# Patient Record
Sex: MALE | Race: WHITE | Employment: FULL TIME | URBAN - METROPOLITAN AREA
[De-identification: names, ages, dates, MRNs, and addresses within clinical notes are randomized per-mention and may not be internally consistent; named-entity substitution may affect disease eponyms.]

---

## 2017-06-13 ENCOUNTER — HOSPITAL ENCOUNTER (EMERGENCY)
Age: 32
Discharge: HOME | End: 2017-06-13
Attending: EMERGENCY MEDICINE

## 2017-06-13 VITALS
RESPIRATION RATE: 16 BRPM | WEIGHT: 160 LBS | HEIGHT: 72 IN | TEMPERATURE: 98 F | DIASTOLIC BLOOD PRESSURE: 81 MMHG | OXYGEN SATURATION: 99 % | SYSTOLIC BLOOD PRESSURE: 132 MMHG | BODY MASS INDEX: 21.67 KG/M2 | HEART RATE: 97 BPM

## 2017-06-13 DIAGNOSIS — H10.33 ACUTE CONJUNCTIVITIS OF BOTH EYES, UNSPECIFIED ACUTE CONJUNCTIVITIS TYPE: Primary | ICD-10-CM

## 2017-06-13 PROCEDURE — 99283 EMERGENCY DEPT VISIT LOW MDM: CPT

## 2017-06-13 RX ORDER — POLYMYXIN B SULFATE AND TRIMETHOPRIM 1; 10000 MG/ML; [USP'U]/ML
1 SOLUTION OPHTHALMIC EVERY 4 HOURS
Qty: 10 ML | Refills: 0 | Status: SHIPPED | OUTPATIENT
Start: 2017-06-13 | End: 2017-06-23

## 2017-06-13 ASSESSMENT — ENCOUNTER SYMPTOMS
SHORTNESS OF BREATH: 0
EYE DISCHARGE: 1
EYE REDNESS: 1
COUGH: 0

## 2017-08-28 ENCOUNTER — HOSPITAL ENCOUNTER (EMERGENCY)
Age: 32
Discharge: HOME | End: 2017-08-28
Attending: EMERGENCY MEDICINE

## 2017-08-28 VITALS
OXYGEN SATURATION: 96 % | DIASTOLIC BLOOD PRESSURE: 85 MMHG | TEMPERATURE: 98 F | SYSTOLIC BLOOD PRESSURE: 112 MMHG | BODY MASS INDEX: 21.67 KG/M2 | WEIGHT: 160 LBS | HEIGHT: 72 IN | HEART RATE: 58 BPM | RESPIRATION RATE: 14 BRPM

## 2017-08-28 DIAGNOSIS — M54.50 ACUTE LEFT-SIDED LOW BACK PAIN WITHOUT SCIATICA: Primary | ICD-10-CM

## 2017-08-28 PROCEDURE — 99282 EMERGENCY DEPT VISIT SF MDM: CPT

## 2017-08-28 RX ORDER — CYCLOBENZAPRINE HCL 5 MG
5 TABLET ORAL NIGHTLY PRN
Qty: 5 TABLET | Refills: 0 | Status: SHIPPED | OUTPATIENT
Start: 2017-08-28 | End: 2017-09-02

## 2017-08-28 RX ORDER — IBUPROFEN 800 MG/1
800 TABLET ORAL EVERY 8 HOURS
Qty: 21 TABLET | Refills: 0 | Status: SHIPPED | OUTPATIENT
Start: 2017-08-28 | End: 2018-02-08

## 2017-08-28 ASSESSMENT — PAIN DESCRIPTION - LOCATION: LOCATION: BACK

## 2017-08-28 ASSESSMENT — PAIN DESCRIPTION - DESCRIPTORS: DESCRIPTORS: DULL

## 2017-08-28 ASSESSMENT — PAIN DESCRIPTION - ORIENTATION: ORIENTATION: LOWER

## 2017-08-28 ASSESSMENT — PAIN SCALES - GENERAL: PAINLEVEL_OUTOF10: 5

## 2017-08-28 ASSESSMENT — PAIN DESCRIPTION - FREQUENCY: FREQUENCY: CONTINUOUS

## 2017-10-23 ENCOUNTER — HOSPITAL ENCOUNTER (EMERGENCY)
Age: 32
Discharge: HOME | End: 2017-10-23
Attending: EMERGENCY MEDICINE

## 2017-10-23 ENCOUNTER — APPOINTMENT (OUTPATIENT)
Dept: GENERAL RADIOLOGY | Age: 32
End: 2017-10-23

## 2017-10-23 VITALS
OXYGEN SATURATION: 98 % | TEMPERATURE: 98.2 F | RESPIRATION RATE: 20 BRPM | SYSTOLIC BLOOD PRESSURE: 106 MMHG | WEIGHT: 155 LBS | BODY MASS INDEX: 20.99 KG/M2 | HEIGHT: 72 IN | HEART RATE: 67 BPM | DIASTOLIC BLOOD PRESSURE: 49 MMHG

## 2017-10-23 DIAGNOSIS — R07.9 CHEST PAIN, UNSPECIFIED TYPE: Primary | ICD-10-CM

## 2017-10-23 LAB
EKG ATRIAL RATE: 59 BPM
EKG P AXIS: 65 DEGREES
EKG P-R INTERVAL: 152 MS
EKG Q-T INTERVAL: 488 MS
EKG QRS DURATION: 160 MS
EKG QTC CALCULATION (BAZETT): 483 MS
EKG R AXIS: 94 DEGREES
EKG T AXIS: 69 DEGREES
EKG VENTRICULAR RATE: 59 BPM

## 2017-10-23 PROCEDURE — 99285 EMERGENCY DEPT VISIT HI MDM: CPT

## 2017-10-23 PROCEDURE — 6360000002 HC RX W HCPCS: Performed by: EMERGENCY MEDICINE

## 2017-10-23 PROCEDURE — 93005 ELECTROCARDIOGRAM TRACING: CPT

## 2017-10-23 PROCEDURE — 71020 XR CHEST STANDARD TWO VW: CPT

## 2017-10-23 PROCEDURE — 96374 THER/PROPH/DIAG INJ IV PUSH: CPT

## 2017-10-23 RX ORDER — KETOROLAC TROMETHAMINE 30 MG/ML
30 INJECTION, SOLUTION INTRAMUSCULAR; INTRAVENOUS ONCE
Status: COMPLETED | OUTPATIENT
Start: 2017-10-23 | End: 2017-10-23

## 2017-10-23 RX ORDER — KETOROLAC TROMETHAMINE 30 MG/ML
30 INJECTION, SOLUTION INTRAMUSCULAR; INTRAVENOUS ONCE
Status: DISCONTINUED | OUTPATIENT
Start: 2017-10-23 | End: 2017-10-23

## 2017-10-23 RX ADMIN — KETOROLAC TROMETHAMINE 30 MG: 30 INJECTION, SOLUTION INTRAMUSCULAR at 02:58

## 2017-10-23 ASSESSMENT — PAIN DESCRIPTION - PAIN TYPE: TYPE: ACUTE PAIN

## 2017-10-23 ASSESSMENT — PAIN DESCRIPTION - ORIENTATION: ORIENTATION: LEFT

## 2017-10-23 ASSESSMENT — ENCOUNTER SYMPTOMS
BACK PAIN: 0
RESPIRATORY NEGATIVE: 1
NAUSEA: 0
EYES NEGATIVE: 1
ABDOMINAL PAIN: 0
SHORTNESS OF BREATH: 0
COUGH: 0
GASTROINTESTINAL NEGATIVE: 1

## 2017-10-23 ASSESSMENT — PAIN SCALES - GENERAL
PAINLEVEL_OUTOF10: 8
PAINLEVEL_OUTOF10: 8
PAINLEVEL_OUTOF10: 4

## 2017-10-23 ASSESSMENT — PAIN DESCRIPTION - LOCATION: LOCATION: CHEST

## 2017-10-23 ASSESSMENT — PAIN DESCRIPTION - DESCRIPTORS: DESCRIPTORS: SHARP;RADIATING

## 2017-10-23 NOTE — ED NOTES
Pt given instructions for follow-up and discharge. Pt verbalizes understanding. Pt reports pain is decreased at this time. Pt is A&O x4, PWD, eupneic, and ambulatory with steady, even gait upon discharge.       Cristi Diez RN  10/23/17 8549

## 2017-10-23 NOTE — ED PROVIDER NOTES
instructions at the bedside. CRITICAL CARE:     PROCEDURES:    Procedures    DIAGNOSTIC RESULTS   EKG: All EKG's are interpreted by the Emergency Department Physician who either signs or Co-signs this chart in the absence of a cardiologist.  Ekg, rate of 59, sinus yeimy, rbbb, nonspecific st seg changes, no acute ischemic changes, unchanged compared to prior in 2014. RADIOLOGY:All plain film, CT, MRI, and formal ultrasound images (except ED bedside ultrasound) are read by the radiologist and interpretations are viewed by the emergency physician. XR CHEST STANDARD (2 VW)   Final Result   No acute process. LABS: All lab results were reviewed by myself, and all abnormals are listed below. Labs Reviewed - No data to display  EMERGENCY DEPARTMENT COURSE:     Vitals:    Vitals:    10/23/17 0232 10/23/17 0245 10/23/17 0338   BP: 125/66 (!) 124/54 (!) 106/49   Pulse: 59 74 67   Resp: 28 14 20   Temp: 98.2 °F (36.8 °C)     TempSrc: Oral     SpO2: 98% 100% 98%   Weight: 155 lb (70.3 kg)     Height: 6' (1.829 m)       The patient was given the following medications while in the emergency department:  Orders Placed This Encounter   Medications    DISCONTD: ketorolac (TORADOL) injection 30 mg    ketorolac (TORADOL) injection 30 mg     CONSULTS:  None    FINAL IMPRESSION      1.  Chest pain, unspecified type          DISPOSITION/PLAN   DISPOSITION Decision to Discharge    PATIENT REFERRED TO:  see clinic list    Call in 1 day      DISCHARGE MEDICATIONS:  New Prescriptions    No medications on file     Justin Scruggs MD  Attending Emergency Physician                      Bhumika Lara MD  10/23/17 9775

## 2017-12-12 ENCOUNTER — APPOINTMENT (OUTPATIENT)
Dept: GENERAL RADIOLOGY | Age: 32
End: 2017-12-12
Payer: COMMERCIAL

## 2017-12-12 ENCOUNTER — HOSPITAL ENCOUNTER (EMERGENCY)
Age: 32
Discharge: HOME | End: 2017-12-12
Attending: EMERGENCY MEDICINE
Payer: COMMERCIAL

## 2017-12-12 VITALS
HEIGHT: 71 IN | SYSTOLIC BLOOD PRESSURE: 113 MMHG | HEART RATE: 78 BPM | RESPIRATION RATE: 16 BRPM | OXYGEN SATURATION: 100 % | DIASTOLIC BLOOD PRESSURE: 79 MMHG | BODY MASS INDEX: 22.4 KG/M2 | TEMPERATURE: 98.5 F | WEIGHT: 160 LBS

## 2017-12-12 DIAGNOSIS — R05.9 COUGH: Primary | ICD-10-CM

## 2017-12-12 PROCEDURE — 99283 EMERGENCY DEPT VISIT LOW MDM: CPT

## 2017-12-12 PROCEDURE — 71020 XR CHEST STANDARD TWO VW: CPT

## 2017-12-12 PROCEDURE — 6370000000 HC RX 637 (ALT 250 FOR IP): Performed by: PHYSICIAN ASSISTANT

## 2017-12-12 RX ORDER — PREDNISONE 20 MG/1
40 TABLET ORAL DAILY
Qty: 10 TABLET | Refills: 0 | Status: SHIPPED | OUTPATIENT
Start: 2017-12-12 | End: 2017-12-17

## 2017-12-12 RX ORDER — BENZONATATE 100 MG/1
100 CAPSULE ORAL ONCE
Status: COMPLETED | OUTPATIENT
Start: 2017-12-12 | End: 2017-12-12

## 2017-12-12 RX ORDER — ALBUTEROL SULFATE 90 UG/1
2 AEROSOL, METERED RESPIRATORY (INHALATION) EVERY 6 HOURS PRN
Qty: 1 INHALER | Refills: 0 | Status: SHIPPED | OUTPATIENT
Start: 2017-12-12

## 2017-12-12 RX ORDER — BENZONATATE 100 MG/1
100 CAPSULE ORAL 3 TIMES DAILY PRN
Qty: 15 CAPSULE | Refills: 0 | Status: SHIPPED | OUTPATIENT
Start: 2017-12-12

## 2017-12-12 RX ADMIN — BENZONATATE 100 MG: 100 CAPSULE ORAL at 21:44

## 2017-12-12 ASSESSMENT — ENCOUNTER SYMPTOMS
COUGH: 1
VOMITING: 0
SORE THROAT: 1
WHEEZING: 0
SHORTNESS OF BREATH: 0
ANAL BLEEDING: 0
NAUSEA: 0
RHINORRHEA: 0

## 2017-12-13 NOTE — ED PROVIDER NOTES
[address] ED  eMERGENCY dEPARTMENT eNCOUnter      Pt Name: Hugo Amaya  MRN: [id number]  [place] 1985  Date of evaluation: 12/12/2017  Provider: Viola Harvey PA-C    CHIEF COMPLAINT       Chief Complaint   Patient presents with    Cough           HISTORY OF PRESENT ILLNESS  (Location/Symptom, Timing/Onset, Context/Setting, Quality, Duration, Modifying Factors, Severity.)   Hugo Amaya is a 33 y.o. male who presents to the emergency department with complaints of cough for 3-4 days. Pt states he had been coughing up yellow and green phlegm. States he has associated nasal congestion, scrathcy throat, and mild headache. Denies fever, chills, ear pain, chest pain, sob, nausea, vomiting, abd pain. No history of DVT or PE. No other complaints. Nursing Notes were reviewed. REVIEW OF SYSTEMS    (2-9 systems for level 4, 10 or more for level 5)     Review of Systems   Constitutional: Negative for chills and fever. HENT: Positive for congestion and sore throat. Negative for ear pain and rhinorrhea. Respiratory: Positive for cough. Negative for shortness of breath and wheezing. Cardiovascular: Negative for chest pain. Gastrointestinal: Negative for anal bleeding, nausea and vomiting. Neurological: Positive for headaches. Except as noted above the remainder of the review of systems was reviewed and negative.        PAST MEDICAL HISTORY     Past Medical History:   Diagnosis Date    Mitral valve prolapse      None otherwise stated in nurses notes    SURGICAL HISTORY       Past Surgical History:   Procedure Laterality Date    CARDIAC SURGERY  infant     None otherwise stated in nurses notes    CURRENT MEDICATIONS       Discharge Medication List as of 12/12/2017 10:28 PM      CONTINUE these medications which have NOT CHANGED    Details   ibuprofen (ADVIL;MOTRIN) 800 MG tablet Take 1 tablet by mouth every 8 hours as needed for Pain, Disp-30 tablet, R-0             ALLERGIES Inhale 2 puffs into the lungs every 6 hours as needed for Wheezing, Disp-1 Inhaler, R-0Print      predniSONE (DELTASONE) 20 MG tablet Take 2 tablets by mouth daily for 5 days, Disp-10 tablet, R-0Print      benzonatate (TESSALON PERLES) 100 MG capsule Take 1 capsule by mouth 3 times daily as needed for Cough, Disp-15 capsule, R-0Print               Summation      Patient Course:  Cough for 3-4 days with yellow sputum. Headache, scratchy throat, and nasal congestion. Xray unremarkable. Will discharge home with tessalon perles, prednisone, albuterol. Follow up with PCP. Return if symptoms change or worsen. Pt understands and agrees with plan. ED Medications administered this visit:    Medications   benzonatate (TESSALON) capsule 100 mg (100 mg Oral Given 12/12/17 2235)       New Prescriptions from this visit:    Discharge Medication List as of 12/12/2017 10:28 PM      START taking these medications    Details   albuterol sulfate HFA (PROAIR HFA) 108 (90 Base) MCG/ACT inhaler Inhale 2 puffs into the lungs every 6 hours as needed for Wheezing, Disp-1 Inhaler, R-0Print      predniSONE (DELTASONE) 20 MG tablet Take 2 tablets by mouth daily for 5 days, Disp-10 tablet, R-0Print      benzonatate (TESSALON PERLES) 100 MG capsule Take 1 capsule by mouth 3 times daily as needed for Cough, Disp-15 capsule, R-0Print             Follow-up:  [facility] ED  [address]enny [address] [id number] [phone]    If symptoms worsen    pcp  see clinic list            Final Impression:   1.  Cough               (Please note that portions of this note were completed with a voice recognition program.  Efforts were made to edit the dictations but occasionally words are mis-transcribed.)      (Please note that portions of this note were completed with a voice recognition program.  Efforts were made to edit the dictations but occasionally words are mis-transcribed.)    Maykel Pool 83, ANTOINE  12/13/17 0022

## 2018-01-03 ENCOUNTER — HOSPITAL ENCOUNTER (EMERGENCY)
Age: 33
Discharge: HOME | End: 2018-01-03
Attending: EMERGENCY MEDICINE
Payer: COMMERCIAL

## 2018-01-03 VITALS
RESPIRATION RATE: 18 BRPM | BODY MASS INDEX: 21.67 KG/M2 | HEART RATE: 89 BPM | WEIGHT: 160 LBS | OXYGEN SATURATION: 100 % | DIASTOLIC BLOOD PRESSURE: 89 MMHG | TEMPERATURE: 97 F | SYSTOLIC BLOOD PRESSURE: 130 MMHG | HEIGHT: 72 IN

## 2018-01-03 DIAGNOSIS — K08.89 PAIN, DENTAL: Primary | ICD-10-CM

## 2018-01-03 PROCEDURE — 99282 EMERGENCY DEPT VISIT SF MDM: CPT

## 2018-01-03 PROCEDURE — 6370000000 HC RX 637 (ALT 250 FOR IP): Performed by: STUDENT IN AN ORGANIZED HEALTH CARE EDUCATION/TRAINING PROGRAM

## 2018-01-03 RX ORDER — PENICILLIN V POTASSIUM 500 MG/1
500 TABLET ORAL 4 TIMES DAILY
Qty: 28 TABLET | Refills: 0 | Status: SHIPPED | OUTPATIENT
Start: 2018-01-03 | End: 2018-01-10

## 2018-01-03 RX ORDER — PENICILLIN V POTASSIUM 250 MG/1
500 TABLET ORAL ONCE
Status: COMPLETED | OUTPATIENT
Start: 2018-01-03 | End: 2018-01-03

## 2018-01-03 RX ORDER — IBUPROFEN 800 MG/1
800 TABLET ORAL ONCE
Status: COMPLETED | OUTPATIENT
Start: 2018-01-03 | End: 2018-01-03

## 2018-01-03 RX ADMIN — PENICILLIN V POTASSIUM 500 MG: 250 TABLET ORAL at 23:16

## 2018-01-03 RX ADMIN — IBUPROFEN 800 MG: 800 TABLET, FILM COATED ORAL at 23:15

## 2018-01-03 ASSESSMENT — ENCOUNTER SYMPTOMS
TROUBLE SWALLOWING: 0
VOICE CHANGE: 0
VOMITING: 0
SHORTNESS OF BREATH: 0
SORE THROAT: 0
NAUSEA: 0
ABDOMINAL PAIN: 0

## 2018-01-03 ASSESSMENT — PAIN DESCRIPTION - PAIN TYPE: TYPE: ACUTE PAIN

## 2018-01-03 ASSESSMENT — PAIN DESCRIPTION - ORIENTATION: ORIENTATION: LEFT

## 2018-01-03 ASSESSMENT — PAIN DESCRIPTION - LOCATION: LOCATION: MOUTH

## 2018-01-03 ASSESSMENT — PAIN SCALES - GENERAL
PAINLEVEL_OUTOF10: 10
PAINLEVEL_OUTOF10: 10

## 2018-01-03 ASSESSMENT — PAIN DESCRIPTION - DESCRIPTORS: DESCRIPTORS: THROBBING

## 2018-01-04 NOTE — ED PROVIDER NOTES
[facility]     Emergency Department     Faculty Attestation    I performed a history and physical examination of the patient and discussed management with the resident. I reviewed the residents note and agree with the documented findings and plan of care. Any areas of disagreement are noted on the chart. I was personally present for the key portions of any procedures. I have documented in the chart those procedures where I was not present during the key portions. I have reviewed the emergency nurses triage note. I agree with the chief complaint, past medical history, past surgical history, allergies, medications, social and family history as documented unless otherwise noted below. Documentation of the HPI, Physical Exam and Medical Decision Making performed by medical students or scribes is based on my personal performance of the HPI, PE and MDM. For Physician Assistant/ Nurse Practitioner cases/documentation I have personally evaluated this patient and have completed at least one if not all key elements of the E/M (history, physical exam, and MDM). Additional findings are as noted. Primary Care Physician: No primary care provider on file. History: This is a 33 y.o. male who presents to the Emergency Department with complaint of Dental pain. Is been ongoing for last 7 days. Patient denies any fever, chills or sweats. The patient denies any difficulty swallowing or shortness of breath    Physical:   height is 6' (1.829 m) and weight is 160 lb (72.6 kg). His oral temperature is 97 °F (36.1 °C). His blood pressure is 130/89 and his pulse is 89. His respiration is 18 and oxygen saturation is 100%. The patient has multiple dental caries noted. There is no tongue elevation noted. The patient has no abscess. Airways patent there is no pooling of oral secretions.      Impression: Dental caries    Plan: Antibiotics, analgesia and dentist follow-up    Anne Andre MD  Attending Emergency  Physician       Vero Moody MD  01/03/18 6516

## 2018-01-04 NOTE — ED PROVIDER NOTES
[address]  ED  Emergency Department Encounter  Emergency Medicine Resident     Pt Name: Ángel Huynh  MRN: [id number]  [place] 1985  Date of evaluation: 1/3/18  PCP:  No primary care provider on file. CHIEF COMPLAINT       Chief Complaint   Patient presents with    Dental Pain     pt stated he has lower and upper left dental pain. HISTORY OF PRESENT ILLNESS  (Location/Symptom, Timing/Onset, Context/Setting, Quality, Duration, Modifying Factors, Severity.)      Ángel Huynh is a 33 y.o. male who presents with (Posterior molar pain. Patient noticed that over the last month he's noticed shipping on his lower left molar, and is not complaining of 2 days of dental pain. Patient denies fever, chills, nausea, vomiting. Patient denies difficulty eating or drinking or tolerating secretions. He states that he does have dental follow-up at the end of this month. Patient denies abscess, or discharge from area. Patient does not have any other complaints at this time. Patient has been using Tylenol intermittently for pain control. PAST MEDICAL / SURGICAL / SOCIAL / FAMILY HISTORY      has a past medical history of Mitral valve prolapse.     has a past surgical history that includes Cardiac surgery (infant). Social History     Social History    Marital status: Single     Spouse name: N/A    Number of children: N/A    Years of education: N/A     Occupational History    Not on file. Social History Main Topics    Smoking status: Current Every Day Smoker     Packs/day: 0.50     Years: 10.00     Types: Cigarettes    Smokeless tobacco: Never Used    Alcohol use No    Drug use: Yes     Types: Marijuana    Sexual activity: Yes     Partners: Female     Other Topics Concern    Not on file     Social History Narrative    No narrative on file       History reviewed. No pertinent family history. Allergies:  Review of patient's allergies indicates no known allergies.     Home (VEETID) 500 MG TABLET    Take 1 tablet by mouth 4 times daily for 7 days       Len Bella DO  Emergency Medicine Resident    (Please note that portions of this note were completed with a voice recognition program.  Efforts were made to edit the dictations but occasionally words are mis-transcribed.)        Len Bella DO  01/03/18 5934

## 2018-01-09 ENCOUNTER — HOSPITAL ENCOUNTER (EMERGENCY)
Age: 33
Discharge: HOME | End: 2018-01-09
Attending: EMERGENCY MEDICINE
Payer: COMMERCIAL

## 2018-01-09 VITALS
RESPIRATION RATE: 17 BRPM | BODY MASS INDEX: 21.7 KG/M2 | WEIGHT: 160 LBS | DIASTOLIC BLOOD PRESSURE: 71 MMHG | HEART RATE: 84 BPM | SYSTOLIC BLOOD PRESSURE: 112 MMHG | OXYGEN SATURATION: 99 % | TEMPERATURE: 97.3 F

## 2018-01-09 DIAGNOSIS — R19.7 NAUSEA VOMITING AND DIARRHEA: Primary | ICD-10-CM

## 2018-01-09 DIAGNOSIS — R11.2 NAUSEA VOMITING AND DIARRHEA: Primary | ICD-10-CM

## 2018-01-09 PROCEDURE — 99283 EMERGENCY DEPT VISIT LOW MDM: CPT

## 2018-01-09 PROCEDURE — 6360000002 HC RX W HCPCS: Performed by: EMERGENCY MEDICINE

## 2018-01-09 RX ORDER — ONDANSETRON 4 MG/1
4 TABLET, FILM COATED ORAL EVERY 8 HOURS PRN
Qty: 8 TABLET | Refills: 0 | Status: SHIPPED | OUTPATIENT
Start: 2018-01-09

## 2018-01-09 RX ORDER — ONDANSETRON 4 MG/1
4 TABLET, FILM COATED ORAL ONCE
Status: COMPLETED | OUTPATIENT
Start: 2018-01-09 | End: 2018-01-09

## 2018-01-09 RX ADMIN — ONDANSETRON HYDROCHLORIDE 4 MG: 4 TABLET, FILM COATED ORAL at 23:29

## 2018-01-09 ASSESSMENT — ENCOUNTER SYMPTOMS
DIARRHEA: 1
BLOOD IN STOOL: 0
SHORTNESS OF BREATH: 0
NAUSEA: 1
CONSTIPATION: 0
COUGH: 0
VOMITING: 1
ABDOMINAL PAIN: 0
BACK PAIN: 0

## 2018-01-10 NOTE — ED NOTES
Pt to ed with c/o nausea, vomiting and diarrhea since waking up. Pt denies abdominal discomfort beside nausea. Pt arrived alert and oriented x 4, states watery diarrhea all day as well, able to keep down Gatorade at this point.       Kim Faye RN  01/09/18 6497

## 2018-01-10 NOTE — ED PROVIDER NOTES
[address] [facility]  ED  Emergency Department Encounter  Emergency Medicine Resident     Pt Name: Josephine Espinoza  MRN: [id number]  [place] 1985  Date of evaluation: 1/9/18  PCP:  No primary care provider on file. CHIEF COMPLAINT       Chief Complaint   Patient presents with    Emesis    Diarrhea       HISTORY OF PRESENT ILLNESS  (Location/Symptom, Timing/Onset, Context/Setting, Quality, Duration, Modifying Factors, Severity.)      Josephine Espinoza is a 33 y.o. male who presents with One episode of nonbloody nonbilious emesis and a few episodes of nonbloody diarrhea for the past day. Patient denies any associated abdominal pain. No fever or chills at home. No one else with similar symptoms. He denies eating anything out of the urinary. No camping or traveling. No history of similar symptoms in the past.  The urinary complaints. States he has been drinking Gatorade without any difficulty. He denies any coughing, congestion, runny nose, sore throat, body aches. No chest pain shortness of breath. he is currently on penicillin for dental pain. PAST MEDICAL / SURGICAL / SOCIAL / FAMILY HISTORY      has a past medical history of Mitral valve prolapse.     has a past surgical history that includes Cardiac surgery (infant). Social History     Social History    Marital status: Single     Spouse name: N/A    Number of children: N/A    Years of education: N/A     Occupational History    Not on file. Social History Main Topics    Smoking status: Current Every Day Smoker     Packs/day: 0.50     Years: 10.00     Types: Cigarettes    Smokeless tobacco: Never Used    Alcohol use No    Drug use: Yes     Types: Marijuana    Sexual activity: Yes     Partners: Female     Other Topics Concern    Not on file     Social History Narrative    No narrative on file       History reviewed. No pertinent family history. Allergies:  Review of patient's allergies indicates no known allergies.     Home well-nourished. No distress. HENT:   Head: Normocephalic and atraumatic. Eyes: Pupils are equal, round, and reactive to light. Neck: Normal range of motion. Neck supple. Cardiovascular: Normal rate, regular rhythm, normal heart sounds and intact distal pulses. No murmur heard. Pulmonary/Chest: Effort normal and breath sounds normal. No respiratory distress. He has no wheezes. He has no rales. He exhibits no tenderness. Abdominal: Soft. He exhibits no distension. There is no tenderness. There is no rebound and no guarding. Abdomen soft, nondistended, nontender. Musculoskeletal: Normal range of motion. He exhibits no edema or tenderness. Neurological: He is alert and oriented to person, place, and time. Skin: Skin is warm and dry. No rash noted. He is not diaphoretic. No erythema. Psychiatric: He has a normal mood and affect. His behavior is normal.   Nursing note and vitals reviewed. DIFFERENTIAL  DIAGNOSIS     PLAN (LABS / IMAGING / EKG):  No orders of the defined types were placed in this encounter. MEDICATIONS ORDERED:  Orders Placed This Encounter   Medications    ondansetron (ZOFRAN) tablet 4 mg    ondansetron (ZOFRAN) 4 MG tablet     Sig: Take 1 tablet by mouth every 8 hours as needed for Nausea or Vomiting     Dispense:  8 tablet     Refill:  0       DDX: gastroenteritis    DIAGNOSTIC RESULTS / EMERGENCY DEPARTMENT COURSE / MDM     LABS:  No results found for this visit on 01/09/18. IMPRESSION: 20-year-old male presenting with nausea, vomiting ×1, diarrhea for the past day and a half. He is able to tolerate by mouth intake. No abdominal pain. No fever or chills. No urinary complaints. No URI symptoms. No one else with similar symptoms at home. No recent traveling, camping. He is on penicillin but no other antibiotics recently. Vital signs completely within normal limits. He is well-appearing, nontoxic. His abdomen is soft, nondistended, nontender.  With a benign

## 2018-02-08 ENCOUNTER — HOSPITAL ENCOUNTER (EMERGENCY)
Age: 33
Discharge: HOME | End: 2018-02-08
Attending: EMERGENCY MEDICINE
Payer: COMMERCIAL

## 2018-02-08 ENCOUNTER — APPOINTMENT (OUTPATIENT)
Dept: GENERAL RADIOLOGY | Age: 33
End: 2018-02-08
Payer: COMMERCIAL

## 2018-02-08 VITALS
BODY MASS INDEX: 21.67 KG/M2 | RESPIRATION RATE: 18 BRPM | WEIGHT: 160 LBS | HEIGHT: 72 IN | OXYGEN SATURATION: 98 % | SYSTOLIC BLOOD PRESSURE: 119 MMHG | DIASTOLIC BLOOD PRESSURE: 76 MMHG | HEART RATE: 80 BPM | TEMPERATURE: 97.2 F
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
## 2018-02-08 PROCEDURE — 6370000000 HC RX 637 (ALT 250 FOR IP): Performed by: STUDENT IN AN ORGANIZED HEALTH CARE EDUCATION/TRAINING PROGRAM

## 2018-02-08 PROCEDURE — 99284 EMERGENCY DEPT VISIT MOD MDM: CPT

## 2018-02-08 PROCEDURE — 73080 X-RAY EXAM OF ELBOW: CPT

## 2018-02-08 PROCEDURE — 72100 X-RAY EXAM L-S SPINE 2/3 VWS: CPT

## 2018-02-08 RX ORDER — ACETAMINOPHEN 325 MG/1
325 TABLET ORAL EVERY 6 HOURS PRN
Qty: 90 TABLET | Refills: 0 | Status: SHIPPED | OUTPATIENT
Start: 2018-02-08

## 2018-02-08 RX ORDER — IBUPROFEN 400 MG/1
400 TABLET ORAL EVERY 8 HOURS
Qty: 60 TABLET | Refills: 0 | Status: SHIPPED | OUTPATIENT
Start: 2018-02-08 | End: 2019-07-25

## 2018-02-08 RX ORDER — ACETAMINOPHEN 325 MG/1
650 TABLET ORAL ONCE
Status: COMPLETED | OUTPATIENT
Start: 2018-02-08 | End: 2018-02-08

## 2018-02-08 RX ORDER — CYCLOBENZAPRINE HCL 10 MG
10 TABLET ORAL 2 TIMES DAILY PRN
Qty: 10 TABLET | Refills: 0 | Status: SHIPPED | OUTPATIENT
Start: 2018-02-08 | End: 2018-02-18

## 2018-02-08 RX ADMIN — ACETAMINOPHEN 650 MG: 325 TABLET ORAL at 01:10

## 2018-02-08 ASSESSMENT — PAIN DESCRIPTION - PAIN TYPE: TYPE: ACUTE PAIN

## 2018-02-08 ASSESSMENT — PAIN DESCRIPTION - FREQUENCY: FREQUENCY: CONTINUOUS

## 2018-02-08 ASSESSMENT — ENCOUNTER SYMPTOMS
SHORTNESS OF BREATH: 0
WHEEZING: 0
BACK PAIN: 1
ABDOMINAL PAIN: 0

## 2018-02-08 ASSESSMENT — PAIN DESCRIPTION - ONSET: ONSET: SUDDEN

## 2018-02-08 ASSESSMENT — PAIN DESCRIPTION - DESCRIPTORS: DESCRIPTORS: SHARP

## 2018-02-08 ASSESSMENT — PAIN DESCRIPTION - ORIENTATION: ORIENTATION: LOWER;RIGHT;LEFT;MID

## 2018-02-08 ASSESSMENT — PAIN DESCRIPTION - LOCATION: LOCATION: BACK

## 2018-02-08 ASSESSMENT — PAIN SCALES - GENERAL: PAINLEVEL_OUTOF10: 8

## 2018-02-08 NOTE — ED PROVIDER NOTES
[facility] ED  Emergency Department Encounter  Emergency Medicine Resident     Pt Name: Michelle Luong  MRN: [id number]  [place] 1985  Date of evaluation: 2/8/18  PCP:  No primary care provider on file. CHIEF COMPLAINT       Chief Complaint   Patient presents with    Fall     slipped and fell on ice denies hitting head or LOC, lower back pain and right elbow pain       HISTORY OF PRESENT ILLNESS  (Location/Symptom, Timing/Onset, Context/Setting, Quality, Duration, Modifying Factors, Severity.)      Michelle Luong is a 33 y.o. male who presents with Back pain after slipping on the iceyesterday. Patient denies any numbness denies any loss of consciousness denies any cervical thoracic pain. Patient notes that he took some Motrin prior to arrival 600 mg with minimal improvement in his symptoms. Patient denies any other concerns or issues at this time. PAST MEDICAL / SURGICAL / SOCIAL / FAMILY HISTORY      has a past medical history of Mitral valve prolapse.     has a past surgical history that includes Cardiac surgery (infant). Social History     Social History    Marital status: Single     Spouse name: N/A    Number of children: N/A    Years of education: N/A     Occupational History    Not on file. Social History Main Topics    Smoking status: Current Every Day Smoker     Packs/day: 0.50     Years: 10.00     Types: Cigarettes    Smokeless tobacco: Never Used    Alcohol use No    Drug use: Yes     Types: Marijuana    Sexual activity: Yes     Partners: Female     Other Topics Concern    Not on file     Social History Narrative    No narrative on file       Patient was advised to stop smoking or to avoid tobacco use    History reviewed. No pertinent family history. Allergies:  Review of patient's allergies indicates no known allergies. Home Medications:  Prior to Admission medications    Medication Sig Start Date End Date Taking?  Authorizing Provider   ibuprofen agreed. The patient had no further questions. RADIOLOGY:  Xr Lumbar Spine (2-3 Views)    Result Date: 2/8/2018  Lumbar spine without radiographic evidence of acute osseous abnormality. L5-S1 mild degenerative disc disease. Xr Elbow Right (min 3 Views)    Result Date: 2/8/2018  EXAMINATION: 3 VIEWS OF THE RIGHT ELBOW 2/8/2018 1:13 am COMPARISON: None. HISTORY: ORDERING SYSTEM PROVIDED HISTORY: Fall, distal humerus/olecranon TECHNOLOGIST PROVIDED HISTORY: Reason for exam:->Fall, distal humerus/olecranon FINDINGS: There is no evidence of acute fracture. There is normal alignment. No acute joint abnormality. No focal osseous lesion. No focal soft tissue abnormality. No acute osseous abnormality. EKG  None    All EKG's are interpreted by the Emergency Department Physician who either signs or Co-signs this chart in the absence of a cardiologist.    PROCEDURES:  None    CONSULTS:  None      FINAL IMPRESSION      1. Fall, initial encounter          DISPOSITION / PLAN     DISPOSITION Decision To Discharge    PATIENT REFERRED TO:  No follow-up provider specified.     DISCHARGE MEDICATIONS:  Discharge Medication List as of 2/8/2018  1:50 AM      START taking these medications    Details   acetaminophen (TYLENOL) 325 MG tablet Take 1 tablet by mouth every 6 hours as needed for Pain, Disp-90 tablet, R-0Print      cyclobenzaprine (FLEXERIL) 10 MG tablet Take 1 tablet by mouth 2 times daily as needed for Muscle spasms, Disp-10 tablet, R-0Print             Salvador Mojica DO  Emergency Medicine Resident    (Please note that portions of this note were completed with a voice recognition program.  Efforts were made to edit the dictations but occasionally words are mis-transcribed.)       Salvador Mojica DO  Resident  02/08/18 9851

## 2018-02-08 NOTE — ED PROVIDER NOTES
[facility] ED     Emergency Department     Faculty Attestation        I performed a history and physical examination of the patient and discussed management with the resident. I reviewed the residents note and agree with the documented findings and plan of care. Any areas of disagreement are noted on the chart. I was personally present for the key portions of any procedures. I have documented in the chart those procedures where I was not present during the key portions. I have reviewed the emergency nurses triage note. I agree with the chief complaint, past medical history, past surgical history, allergies, medications, social and family history as documented unless otherwise noted below. For mid-level providers such as nurse practitioners as well as physicians assistants:    I have personally seen and evaluated the patient. I find the patient's history and physical exam are consistent with NP/PA documentation. I agree with the care provided, treatment rendered, disposition, & follow-up plan. Additional findings are as noted. Vital Signs: /76   Pulse 80   Temp 97.2 °F (36.2 °C) (Oral)   Resp 18   Ht 6' (1.829 m)   Wt 160 lb (72.6 kg)   SpO2 98%   BMI 21.70 kg/m²   PCP:  No primary care provider on file. Pertinent Comments:           Critical Care  None         Note, if the patient's blood pressure was elevated, and they have no history of hypertension, they were informed of the following: The patient may have Pre-hypertension/Hypertension: The patient has been informed that they may have pre-hypertension or Hypertension based on a blood pressure reading in the emergency department. I recommend that the patient call the primary care provider listed on their discharge instructions or a physician of their choice this week to arrange follow up for further evaluation of possible pre-hypertension or Hypertension.   (Please note that

## 2019-07-25 ENCOUNTER — HOSPITAL ENCOUNTER (EMERGENCY)
Age: 34
Discharge: HOME | End: 2019-07-25
Attending: EMERGENCY MEDICINE
Payer: COMMERCIAL

## 2019-07-25 ENCOUNTER — APPOINTMENT (OUTPATIENT)
Dept: GENERAL RADIOLOGY | Age: 34
End: 2019-07-25
Payer: COMMERCIAL

## 2019-07-25 VITALS
SYSTOLIC BLOOD PRESSURE: 111 MMHG | HEART RATE: 69 BPM | OXYGEN SATURATION: 100 % | RESPIRATION RATE: 16 BRPM | TEMPERATURE: 97.7 F | WEIGHT: 160 LBS | HEIGHT: 72 IN | BODY MASS INDEX: 21.67 KG/M2 | DIASTOLIC BLOOD PRESSURE: 67 MMHG

## 2019-07-25 DIAGNOSIS — M79.605 LEFT LEG PAIN: Primary | ICD-10-CM

## 2019-07-25 PROCEDURE — 99283 EMERGENCY DEPT VISIT LOW MDM: CPT

## 2019-07-25 PROCEDURE — 73562 X-RAY EXAM OF KNEE 3: CPT

## 2019-07-25 RX ORDER — CYCLOBENZAPRINE HCL 10 MG
10 TABLET ORAL EVERY 8 HOURS PRN
Qty: 20 TABLET | Refills: 0 | Status: SHIPPED | OUTPATIENT
Start: 2019-07-25

## 2019-07-25 RX ORDER — IBUPROFEN 800 MG/1
800 TABLET ORAL EVERY 8 HOURS PRN
Qty: 20 TABLET | Refills: 0 | Status: SHIPPED | OUTPATIENT
Start: 2019-07-25 | End: 2022-09-01

## 2019-07-25 ASSESSMENT — ENCOUNTER SYMPTOMS
FACIAL SWELLING: 0
COLOR CHANGE: 0
ABDOMINAL PAIN: 0
EYE REDNESS: 0
VOMITING: 0
CONSTIPATION: 0
COUGH: 0
SHORTNESS OF BREATH: 0
EYE DISCHARGE: 0
DIARRHEA: 0

## 2019-07-25 ASSESSMENT — PAIN DESCRIPTION - FREQUENCY: FREQUENCY: CONTINUOUS

## 2019-07-25 ASSESSMENT — PAIN DESCRIPTION - DESCRIPTORS: DESCRIPTORS: CONSTANT;DISCOMFORT;THROBBING

## 2019-07-25 ASSESSMENT — PAIN DESCRIPTION - ORIENTATION: ORIENTATION: LEFT

## 2019-07-25 ASSESSMENT — PAIN SCALES - GENERAL: PAINLEVEL_OUTOF10: 10

## 2019-07-25 ASSESSMENT — PAIN DESCRIPTION - PAIN TYPE: TYPE: ACUTE PAIN

## 2019-07-25 ASSESSMENT — PAIN DESCRIPTION - LOCATION: LOCATION: LEG

## 2020-11-16 ENCOUNTER — HOSPITAL ENCOUNTER (EMERGENCY)
Age: 35
Discharge: HOME | End: 2020-11-16
Attending: EMERGENCY MEDICINE

## 2020-11-16 VITALS
HEART RATE: 64 BPM | BODY MASS INDEX: 21.67 KG/M2 | OXYGEN SATURATION: 99 % | RESPIRATION RATE: 18 BRPM | WEIGHT: 160 LBS | HEIGHT: 72 IN | TEMPERATURE: 98.4 F | SYSTOLIC BLOOD PRESSURE: 126 MMHG | DIASTOLIC BLOOD PRESSURE: 77 MMHG

## 2020-11-16 PROCEDURE — 6370000000 HC RX 637 (ALT 250 FOR IP): Performed by: STUDENT IN AN ORGANIZED HEALTH CARE EDUCATION/TRAINING PROGRAM

## 2020-11-16 PROCEDURE — 99284 EMERGENCY DEPT VISIT MOD MDM: CPT

## 2020-11-16 RX ORDER — IBUPROFEN 800 MG/1
800 TABLET ORAL ONCE
Status: COMPLETED | OUTPATIENT
Start: 2020-11-16 | End: 2020-11-16

## 2020-11-16 RX ORDER — IBUPROFEN 800 MG/1
800 TABLET ORAL EVERY 8 HOURS SCHEDULED
Qty: 30 TABLET | Refills: 0 | Status: SHIPPED | OUTPATIENT
Start: 2020-11-16 | End: 2022-09-01

## 2020-11-16 RX ADMIN — IBUPROFEN 800 MG: 800 TABLET ORAL at 02:15

## 2020-11-16 ASSESSMENT — ENCOUNTER SYMPTOMS
SINUS PAIN: 0
NAUSEA: 0
CHEST TIGHTNESS: 0
SHORTNESS OF BREATH: 0
WHEEZING: 0
VOMITING: 0
BACK PAIN: 0
SINUS PRESSURE: 0

## 2020-11-16 ASSESSMENT — PAIN DESCRIPTION - LOCATION: LOCATION: ARM

## 2020-11-16 ASSESSMENT — PAIN SCALES - GENERAL
PAINLEVEL_OUTOF10: 4
PAINLEVEL_OUTOF10: 4

## 2020-11-16 ASSESSMENT — PAIN DESCRIPTION - FREQUENCY: FREQUENCY: CONTINUOUS

## 2020-11-16 ASSESSMENT — PAIN DESCRIPTION - DESCRIPTORS: DESCRIPTORS: TIGHTNESS

## 2020-11-16 ASSESSMENT — PAIN DESCRIPTION - PAIN TYPE: TYPE: ACUTE PAIN

## 2020-11-16 NOTE — ED PROVIDER NOTES
[facility] ED  Emergency Department Encounter  EmergencyMedicine Resident     Pt Name:Wilfredo Baxter  MRN: [id number]  [place] 1985  Date of evaluation: 11/16/20  PCP:  No primary care provider on file. CHIEF COMPLAINT       Chief Complaint   Patient presents with    Arm Pain     intermittent left arm pain and numbness since friday, woke him up from sleep tonight        HISTORY OF PRESENT ILLNESS  (Location/Symptom, Timing/Onset, Context/Setting, Quality, Duration, Modifying Factors, Severity.)      Laura Gannon is a 30 y.o. male who presents with left arm pain which radiates from left elbow down to fingers of left hand. Patient also reports intermittent numbness and tingling of the hand. Patient states he recently started a new job and has frequent twisting and grasping motions at work. States that pain has been worse since starting this new job. Reports intermittent numbness throughout the day which resolved spontaneously. Denies any recent injury. Denies swelling, shortness of breath, chest pain, fever, chills. Patient reports past medical history of mitral valve prolapse and cardiac surgery with prolapse repair as an infant. PAST MEDICAL / SURGICAL / SOCIAL / FAMILY HISTORY      has a past medical history of Mitral valve prolapse.       has a past surgical history that includes Cardiac surgery (infant).       Social History     Socioeconomic History    Marital status: Single     Spouse name: Not on file    Number of children: Not on file    Years of education: Not on file    Highest education level: Not on file   Occupational History    Not on file   Social Needs    Financial resource strain: Not on file    Food insecurity     Worry: Not on file     Inability: Not on file    Transportation needs     Medical: Not on file     Non-medical: Not on file   Tobacco Use    Smoking status: Current Every Day Smoker     Packs/day: 0.50     Years: 10.00     Pack years: 5.00 Types: Cigarettes    Smokeless tobacco: Never Used   Substance and Sexual Activity    Alcohol use: Yes     Comment: social    Drug use: Yes     Types: Marijuana    Sexual activity: Yes     Partners: Female   Lifestyle    Physical activity     Days per week: Not on file     Minutes per session: Not on file    Stress: Not on file   Relationships    Social connections     Talks on phone: Not on file     Gets together: Not on file     Attends Gnosticism service: Not on file     Active member of club or organization: Not on file     Attends meetings of clubs or organizations: Not on file     Relationship status: Not on file    Intimate partner violence     Fear of current or ex partner: Not on file     Emotionally abused: Not on file     Physically abused: Not on file     Forced sexual activity: Not on file   Other Topics Concern    Not on file   Social History Narrative    Not on file       No family history on file. Allergies:  Patient has no known allergies. Home Medications:  Prior to Admission medications    Medication Sig Start Date End Date Taking?  Authorizing Provider   ibuprofen (ADVIL;MOTRIN) 800 MG tablet Take 1 tablet by mouth every 8 hours 11/16/20  Yes Amber Fountain DO   ibuprofen (ADVIL;MOTRIN) 800 MG tablet Take 1 tablet by mouth every 8 hours as needed for Pain 7/25/19   Brandie Han MD   cyclobenzaprine (FLEXERIL) 10 MG tablet Take 1 tablet by mouth every 8 hours as needed for Muscle spasms 7/25/19   Brandie Han MD   acetaminophen (TYLENOL) 325 MG tablet Take 1 tablet by mouth every 6 hours as needed for Pain 2/8/18   Ellie Diallo DO   ondansetron (ZOFRAN) 4 MG tablet Take 1 tablet by mouth every 8 hours as needed for Nausea or Vomiting 1/9/18   Matthew Greene MD   albuterol sulfate HFA (PROAIR HFA) 108 (90 Base) MCG/ACT inhaler Inhale 2 puffs into the lungs every 6 hours as needed for Wheezing 12/12/17   Brendon Grossman PA-C   benzonatate (TESSALON PERLES) 100 MG capsule Take 1 capsule by mouth 3 times daily as needed for Cough 12/12/17   Kole Diaz PA-C       REVIEW OF SYSTEMS    (2-9 systems for level 4, 10 or more for level 5)      Review of Systems   Constitutional: Negative for fatigue and fever. HENT: Negative for postnasal drip, sinus pressure and sinus pain. Respiratory: Negative for chest tightness, shortness of breath and wheezing. Cardiovascular: Negative for chest pain and palpitations. Gastrointestinal: Negative for nausea and vomiting. Endocrine: Negative for polyuria. Genitourinary: Negative for flank pain and frequency. Musculoskeletal: Positive for arthralgias (Left Elbow pain). Negative for back pain, myalgias, neck pain and neck stiffness. Skin: Negative for rash and wound. Neurological: Negative for weakness and numbness. Psychiatric/Behavioral: Negative for confusion. PHYSICAL EXAM   (up to 7 for level 4, 8 or more for level 5)      INITIAL VITALS:   /77   Pulse 64   Temp 98.4 °F (36.9 °C) (Temporal)   Resp 18   Ht 6' (1.829 m)   Wt 160 lb (72.6 kg)   SpO2 99%   BMI 21.70 kg/m²     Physical Exam  Constitutional:       General: He is not in acute distress. Appearance: He is not toxic-appearing. HENT:      Head: Normocephalic and atraumatic. Cardiovascular:      Rate and Rhythm: Normal rate. Pulses: Normal pulses. Comments: Normal Giuseppe's test of left hand. Pulmonary:      Effort: Pulmonary effort is normal.   Musculoskeletal: Normal range of motion. General: Tenderness (Tenderness to palpation of left medial epicondyle of elbow.) present. No swelling or signs of injury. Right lower leg: No edema. Left lower leg: No edema. Comments: Normal active and passive range of motion in all aspects of left elbow. Normal  strength of left hand. No swelling of extremity, no rash, mottling or cyanosis. Capillary refill less than 2 seconds in all fingers of left hand.    Skin: Capillary Refill: Capillary refill takes less than 2 seconds. Findings: No bruising or erythema. Neurological:      Sensory: No sensory deficit. Motor: No weakness. Comments: Normal strength and intact sensation of left upper extremity. Negative Spurling sign         DIFFERENTIAL  DIAGNOSIS     PLAN (LABS / IMAGING / EKG):  No orders of the defined types were placed in this encounter. MEDICATIONS ORDERED:  Orders Placed This Encounter   Medications    ibuprofen (ADVIL;MOTRIN) tablet 800 mg    ibuprofen (ADVIL;MOTRIN) 800 MG tablet     Sig: Take 1 tablet by mouth every 8 hours     Dispense:  30 tablet     Refill:  0       DDX: Medial epicondylitis, cubital tunnel syndrome, arterial insufficiency    DIAGNOSTIC RESULTS / EMERGENCY DEPARTMENT COURSE / MDM   LAB RESULTS:  No results found for this visit on 11/16/20. IMPRESSION: 60-year-old male in no acute distress presenting with left arm pain with associated numbness and tingling of fingers of left hand. Likely medial epicondylitis versus cubital tunnel syndrome. No evidence of trauma. No signs or symptoms of infection. EMERGENCY DEPARTMENT COURSE:  Evaluated patient bedside, vital signs stable, afebrile. Normal Giuseppe's test on exam.  Very refill less than 2 seconds. Patient with intact sensation all fingers of the left hand. Intact range of motion and normal  strength. Patient with mild amount of tenderness over medial epicondyle. Likely medial epicondylitis. Treated with p.o. ibuprofen in emergency department. Given outpatient prescription for ibuprofen. Patient was given strict ED return cautions and follow-up directions. He verbalized understanding of and agreement with discharge plan    PROCEDURES:  None    CONSULTS:  None    CRITICAL CARE:  Please see attending note    FINAL IMPRESSION      1.  Medial epicondylitis of elbow, left          DISPOSITION / PLAN     DISPOSITION Decision To Discharge 11/16/2020 02:44:29 AM      PATIENT REFERRED TO:  MARLYN [facility]  [address] [facility] [address]  [phone]  Schedule an appointment as soon as possible for a visit in 1 week  For re-evaluation is symptoms do not improve      DISCHARGE MEDICATIONS:  Discharge Medication List as of 11/16/2020  2:48 AM      START taking these medications    Details   !! ibuprofen (ADVIL;MOTRIN) 800 MG tablet Take 1 tablet by mouth every 8 hours, Disp-30 tablet,R-0Print       !! - Potential duplicate medications found. Please discuss with provider.           Evelyne Godinez DO  Emergency Medicine Resident    (Please note that portions of thisnote were completed with a voice recognition program.  Efforts were made to edit the dictations but occasionally words are mis-transcribed.)        Evelyne Godinez DO  Resident  11/16/20 5719

## 2020-11-18 NOTE — ED PROVIDER NOTES
[facility] ED  Emergency Department  Faculty Attestation       I performed a history and physical examination of the patient and discussed management with the resident. I reviewed the residents note and agree with the documented findings including all diagnostic interpretations and plan of care. Any areas of disagreement are noted on the chart. I was personally present for the key portions of any procedures. I have documented in the chart those procedures where I was not present during the key portions. I have reviewed the emergency nurses triage note. I agree with the chief complaint, past medical history, past surgical history, allergies, medications, social and family history as documented unless otherwise noted below. Documentation of the HPI, Physical Exam and Medical Decision Making performed by seraibalexis is based on my personal performance of the HPI, PE and MDM. For Physician Assistant/ Nurse Practitioner cases/documentation I have personally evaluated this patient and have completed at least one if not all key elements of the E/M (history, physical exam, and MDM). Additional findings are as noted. Pertinent Comments     Primary Care Physician: No primary care provider on file. ED Triage Vitals   BP Temp Temp Source Pulse Resp SpO2 Height Weight   11/16/20 0209 11/16/20 0158 11/16/20 0158 11/16/20 0209 11/16/20 0209 11/16/20 0209 11/16/20 0209 11/16/20 0209   126/77 98.4 °F (36.9 °C) Temporal 64 18 99 % 6' (1.829 m) 160 lb (72.6 kg)        History/Physical: This is a 30 y.o. male who presents to the Emergency Department with complaint of left arm pain that is intermittent. Radiates from elbow down to the hand. Performs frequent twisting and grasping at his new job. On exam, has some tenderness over the medial epicondyle at the tendon insertion site. No erythema. Worse with pronation and supination of the arm. He has normal  strength.   Normal sensation throughout the arm at this time. Normal cap refill normal pulses. MDM/Plan: No chest pain or shortness of breath. Has just left arm pain and occasional numbness and tingling. Has tenderness over the medial epicondyle consistent with a overuse injury. NSAIDs. Discharge.   Return if concerns arise      CRITICAL CARE: None     Maria Esther Pittman MD  Attending Emergency Physician         Maria Esther Pittman MD  11/18/20 8311

## 2022-09-01 ENCOUNTER — HOSPITAL ENCOUNTER (EMERGENCY)
Age: 37
Discharge: HOME | End: 2022-09-01
Attending: EMERGENCY MEDICINE

## 2022-09-01 VITALS
DIASTOLIC BLOOD PRESSURE: 79 MMHG | SYSTOLIC BLOOD PRESSURE: 114 MMHG | WEIGHT: 160 LBS | BODY MASS INDEX: 21.67 KG/M2 | TEMPERATURE: 98.3 F | RESPIRATION RATE: 16 BRPM | OXYGEN SATURATION: 98 % | HEIGHT: 72 IN | HEART RATE: 62 BPM

## 2022-09-01 DIAGNOSIS — G56.02 CARPAL TUNNEL SYNDROME ON LEFT: Primary | ICD-10-CM

## 2022-09-01 PROCEDURE — 99283 EMERGENCY DEPT VISIT LOW MDM: CPT

## 2022-09-01 RX ORDER — IBUPROFEN 600 MG/1
600 TABLET ORAL EVERY 8 HOURS PRN
Qty: 30 TABLET | Refills: 0 | Status: SHIPPED | OUTPATIENT
Start: 2022-09-01

## 2022-09-01 ASSESSMENT — PAIN SCALES - GENERAL: PAINLEVEL_OUTOF10: 7

## 2022-09-01 NOTE — ED NOTES
Wrist splint applied to LUE per order. Pt shortly after states pain is worse and removed.  MD notified     Landon Helton, MANNIE  09/01/22 3692

## 2022-09-01 NOTE — ED TRIAGE NOTES
Mode of arrival (squad #, walk in, police, etc) : walk in        Chief complaint(s): Left hand/arm numbness        Arrival Note (brief scenario, treatment PTA, etc). : Pt states he has been having left hand/arm numbness for 3 weeks. Pt states the pain starts in his hand and radiates up his arm. Pt denies neck or shoulder pain. C= \"Have you ever felt that you should Cut down on your drinking? \"  No  A= \"Have people Annoyed you by criticizing your drinking? \"  No  G= \"Have you ever felt bad or Guilty about your drinking? \"  No  E= \"Have you ever had a drink as an Eye-opener first thing in the morning to steady your nerves or to help a hangover? \"  No      Deferred []      Reason for deferring: N/A    *If yes to two or more: probable alcohol abuse. *

## 2022-09-01 NOTE — ED PROVIDER NOTES
[address] ED  EMERGENCY DEPARTMENT ENCOUNTER      Pt Name: Juan José Smion  MRN: [id number]  [place] 1985  Date of evaluation: 9/1/22      CHIEF COMPLAINT       Chief Complaint   Patient presents with    Numbness     Left arm         HISTORY OF PRESENT ILLNESS   HPI 40 y.o. male presents with c/o left arm numbness. Symptoms started a few weeks ago. 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 He also notes that he has bad pain in his arm in the morning. Pain goes from his finger tips, up his wrist and into his forearm. He's been taking aspirin with no relief. He has not had other evaluations or treatmens. No neck pain. No rash. REVIEW OF SYSTEMS       Review of Systems   Musculoskeletal:  Negative for arthralgias and neck pain. Skin:  Negative for rash. Neurological:  Positive for numbness. Negative for weakness.      PAST MEDICAL HISTORY     Past Medical History:   Diagnosis Date    Mitral valve prolapse        SURGICAL HISTORY       Past Surgical History:   Procedure Laterality Date    CARDIAC SURGERY  infant       CURRENT MEDICATIONS       Discharge Medication List as of 9/1/2022 12:03 PM        CONTINUE these medications which have NOT CHANGED    Details   cyclobenzaprine (FLEXERIL) 10 MG tablet Take 1 tablet by mouth every 8 hours as needed for Muscle spasms, Disp-20 tablet, R-0Print      acetaminophen (TYLENOL) 325 MG tablet Take 1 tablet by mouth every 6 hours as needed for Pain, Disp-90 tablet, R-0Print      ondansetron (ZOFRAN) 4 MG tablet Take 1 tablet by mouth every 8 hours as needed for Nausea or Vomiting, Disp-8 tablet, R-0Print      albuterol sulfate HFA (PROAIR HFA) 108 (90 Base) MCG/ACT inhaler Inhale 2 puffs into the lungs every 6 hours as needed for Wheezing, Disp-1 Inhaler, R-0Print      benzonatate (TESSALON PERLES) 100 MG capsule Take 1 capsule by mouth 3 times daily as needed for Cough, Disp-15 capsule, R-0Print             ALLERGIES     has No Known Allergies. FAMILY HISTORY     has no family status information on file. SOCIAL HISTORY      reports that he has been smoking cigarettes. He has a 5.00 pack-year smoking history. He has never used smokeless tobacco. He reports current alcohol use. He reports current drug use. Drug: Marijuana Berneta Ran). PHYSICAL EXAM     INITIAL VITALS: /79   Pulse 62   Temp 98.3 °F (36.8 °C) (Oral)   Resp 16   Ht 6' (1.829 m)   Wt 160 lb (72.6 kg)   SpO2 98%   BMI 21.70 kg/m²   Gen: nad  Head: Normocephalic, atraumatic  Eye: Pupils equal round reactive to light, no conjunctivitis  ENT: MMM  Neck: no c-spine ttp  Heart: Regular rate and rhythm no murmurs  Lungs: Clear to auscultation bilaterally, no respiratory distress  Neurologic: Patient is alert and oriented x3, appropriate hand  strenght. Appropriate opposition. fluent speech  MSK: + tinnel sign.  + Phalen sign  Extremities: appropriate capillary refill. Normal radial pulse. No atrophy of the thenar or hypothernar emminence. MEDICAL DECISION MAKING:     MDM    40 y.o. male with signs of CTS. No neck pain to suggest cervical radiculopathy. Discussed symptomatic management. Recommended close follow up with orthopedic specialist if symtpoms do not improve with the treatments we discussed, return to ED if symptoms worsen, and warning precautions provided.      DIAGNOSTIC RESULTS       EMERGENCY DEPARTMENT COURSE:   Vitals:    Vitals:    09/01/22 1149 09/01/22 1150   BP: 114/79    Pulse: 62    Resp:  16   Temp:  98.3 °F (36.8 °C)   TempSrc:  Oral   SpO2: 98%    Weight:  160 lb (72.6 kg)   Height:  6' (1.829 m)       The patient was given the following medications while in the emergency department:  Orders Placed This Encounter   Medications    ibuprofen (ADVIL;MOTRIN) 600 MG tablet     Sig: Take 1 tablet by mouth every 8 hours as needed for Pain     Dispense:  30 tablet     Refill:  0     -------------------------  CRITICAL CARE:   CONSULTS: None  PROCEDURES: Procedures     FINAL IMPRESSION      1.  Carpal tunnel syndrome on left          DISPOSITION/PLAN   DISPOSITION Decision To Discharge 09/01/2022 11:47:36 AM      PATIENT REFERRED TO:  [facility]  [address]  [address] [facility] [id number] [phone]  In 1 week      [address]  [address]  [address] [facility] [address]  [phone]    If symptoms worsen      DISCHARGE MEDICATIONS:  Discharge Medication List as of 9/1/2022 12:03 PM            Palak Potter MD  Attending Emergency Physician                      Palak Potter MD  09/01/22 3267

## 2024-12-06 ENCOUNTER — HOSPITAL ENCOUNTER (EMERGENCY)
Age: 39
Discharge: HOME | End: 2024-12-06
Attending: STUDENT IN AN ORGANIZED HEALTH CARE EDUCATION/TRAINING PROGRAM
Payer: COMMERCIAL

## 2024-12-06 ENCOUNTER — APPOINTMENT (OUTPATIENT)
Dept: GENERAL RADIOLOGY | Age: 39
End: 2024-12-06
Payer: COMMERCIAL

## 2024-12-06 VITALS
DIASTOLIC BLOOD PRESSURE: 84 MMHG | SYSTOLIC BLOOD PRESSURE: 130 MMHG | OXYGEN SATURATION: 100 % | RESPIRATION RATE: 20 BRPM | HEART RATE: 102 BPM | WEIGHT: 160 LBS | TEMPERATURE: 98.7 F | HEIGHT: 72 IN | BODY MASS INDEX: 21.67 KG/M2

## 2024-12-06 DIAGNOSIS — S90.121A CONTUSION OF LESSER TOE OF RIGHT FOOT WITHOUT DAMAGE TO NAIL, INITIAL ENCOUNTER: Primary | ICD-10-CM

## 2024-12-06 PROCEDURE — 73630 X-RAY EXAM OF FOOT: CPT

## 2024-12-06 PROCEDURE — 99283 EMERGENCY DEPT VISIT LOW MDM: CPT

## 2024-12-06 ASSESSMENT — ENCOUNTER SYMPTOMS
CHEST TIGHTNESS: 0
NAUSEA: 0
ABDOMINAL PAIN: 0
VOMITING: 0
DIARRHEA: 0
RHINORRHEA: 0
SHORTNESS OF BREATH: 0
EYE DISCHARGE: 0
SORE THROAT: 0
EYE REDNESS: 0

## 2024-12-06 ASSESSMENT — PAIN SCALES - GENERAL: PAINLEVEL_OUTOF10: 10

## 2024-12-06 ASSESSMENT — PAIN - FUNCTIONAL ASSESSMENT: PAIN_FUNCTIONAL_ASSESSMENT: 0-10

## 2024-12-07 NOTE — ED PROVIDER NOTES
foot without damage to nail, initial encounter          DISPOSITION/PLAN   DISPOSITION Decision To Discharge 12/06/2024 08:40:03 PM   DISPOSITION CONDITION Stable           OUTPATIENT FOLLOW UP THE PATIENT:  Tatiana Sawant, DPM  [address]  Second floor of the [facility] [address]  [phone]    Schedule an appointment as soon as possible for a visit       [facility] Emergency Department  [address] [address]  [phone]    If symptoms worsen      MD Andreina Shepard Jeffrey, MD  12/06/24 2052

## 2025-01-20 ENCOUNTER — HOSPITAL ENCOUNTER (OUTPATIENT)
Dept: PHYSICAL THERAPY | Age: 40
Setting detail: THERAPIES SERIES
Discharge: HOME | End: 2025-01-20
Payer: COMMERCIAL

## 2025-01-20 PROCEDURE — 97110 THERAPEUTIC EXERCISES: CPT

## 2025-01-20 PROCEDURE — 97161 PT EVAL LOW COMPLEX 20 MIN: CPT

## 2025-01-20 NOTE — CONSULTS
[x] [facility]   Outpatient Rehabilitation & Therapy  [address]  P: [phone]   F: [phone]     Physical therapy LE evaluation     Date:  2025  Patient: Wilfredo Ortiz  : 1985  MRN: [id number]  Physician: Ana David APRN - CNP      Insurance: --[place]  , 2-3 x week for 4-6 weeks. 12 visits, valid 1/10 -    Medical Diagnosis: S90.121A (ICD-10-CM) - Contusion of right lesser toe(s) without damage to nail, initial encounter    Rehab Codes: M25.6 , M62.81 , M79.674 , M79.67   Onset Date: 2024 DOI   Next 's appt: TBD      PRECAUTIONS: none    Subjective:   CC: R 5th toe  Patient comes in after work injury. His R 5th toe was smashed from a roller at work. They were able to get the roller off and tried going back to work. Realized he was not able to put weight onto his RLE and went to his supervisor. Did talk to the oncall nurse and was told to follow up at the ER. He went to the ER the next day. Got an xray and found that there was no fracture but very bruised. Reports that his toe was very swollen. Hard to bend the toe as well as toe splaying. Gets intermittent sharp pains. Was on work restriction but then was temporarily taken off in which he reports he was not able to tolerate his normal full work duties d/t the pain. Having issues with squatting and lifting at work with it causing a lot of pain. 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     PMHx: [] Unremarkable [] Diabetes [] HTN  [] Pacemaker   [x] MI/Heart Problems [] Cancer [] Arthritis [] Other:                Past Medical History:   Diagnosis Date    Mitral valve prolapse       PSH:   Past Surgical History:   Procedure Laterality Date    CARDIAC SURGERY  infant        Comorbidities:   [] Obesity [] Dialysis  [] Other:   [] Asthma/COPD [] Dementia [] Other:   [] Stroke [] Sleep apnea

## 2025-01-22 ENCOUNTER — HOSPITAL ENCOUNTER (OUTPATIENT)
Dept: PHYSICAL THERAPY | Age: 40
Setting detail: THERAPIES SERIES
End: 2025-01-22
Payer: COMMERCIAL

## 2025-01-22 ENCOUNTER — HOSPITAL ENCOUNTER (OUTPATIENT)
Dept: PHYSICAL THERAPY | Age: 40
Setting detail: THERAPIES SERIES
Discharge: HOME | End: 2025-01-22
Payer: COMMERCIAL

## 2025-01-22 PROCEDURE — 97110 THERAPEUTIC EXERCISES: CPT

## 2025-01-23 NOTE — FLOWSHEET NOTE
[facility]   Outpatient Rehabilitation & Therapy  [address]  P: [phone]   F: [phone]    Physical Therapy Daily Treatment Note    Date:  2025  Patient Name:  Wilfredo Ortiz    :  1985  MRN: [id number]  Physician: Ana David APRN - CNP                            Insurance: --[place]  , 2-3 x week for 4-6 weeks. 12 visits, valid 1/10 -    Medical Diagnosis: S90.121A (ICD-10-CM) - Contusion of right lesser toe(s) without damage to nail, initial encounter          Rehab Codes: M25.6 , M62.81 , M79.674 , M79.67   Onset Date: 2024 DOI               Next 's appt: TBD  Visit# / total visits:   Cancels/No Shows: 0/0    Subjective:  Patient reports no changes in symptoms since evaluation.  No pain upon arrival due to not having to work today.    Pain:  [] Yes  [x] No Location: Right 4-5th digits/toes   Pain Rating: (0-10 scale) 0/10  Pain altered Tx:  [x] No  [] Yes  Action:  Comments:    Objective:  Modalities:   Precautions:  Exercises:  INTERVENTIONS  Reps/ Time Weight/ Level Completed  Today Comments    PROM Right 4-5 digits  4'    x  pain with flex and ext empty end feel   MODALITIES                                    MANUAL                                    EXERCISES            Kneeling great Toe extension stretch 20\" x3  x    Slant board stretch 3x30\"  x    Towel Scrunches 10 x 3\"   x     Great Toe Extension 10 x   x     Lesser Toe Extension 10 x   x     marble pick ups  1x 14 marbles  x     SL heel raises  10x   x    MOBO board 10x ea   x    Fitter board F/B, Lateral 10x ea dir  x    SLS on foam 20\" x3  x    4-way ankle  10x ea Red x HEP                                                       Specific Instructions for next treatment: review HEP, PROM (self or therapist), marble pick ups, CKC foot / ankle strengthening, kneeling toe extension stretch     Assessment: [x] Progressing toward goals.  Initiated treatment with PROM to 4-5th digit and

## 2025-01-27 ENCOUNTER — APPOINTMENT (OUTPATIENT)
Dept: PHYSICAL THERAPY | Age: 40
End: 2025-01-27
Payer: COMMERCIAL

## 2025-01-29 ENCOUNTER — APPOINTMENT (OUTPATIENT)
Dept: PHYSICAL THERAPY | Age: 40
End: 2025-01-29
Payer: COMMERCIAL

## 2025-01-30 ENCOUNTER — HOSPITAL ENCOUNTER (OUTPATIENT)
Dept: PHYSICAL THERAPY | Age: 40
Setting detail: THERAPIES SERIES
Discharge: HOME | End: 2025-01-30
Payer: COMMERCIAL

## 2025-01-30 NOTE — FLOWSHEET NOTE
[facility]   Outpatient Rehabilitation & Therapy  [address] [facility] [address]  P: [phone]   F: [phone]     Physical Therapy Cancel/No Show note    Date: 2025  Patient: Wilfredo Ortiz  : 1985  MRN: [id number]    Visit Count:   Cancels/No Shows to date:     For today's appointment patient:    [x]  Cancelled    [] Rescheduled appointment    [] No-show     Reason given by patient:    []  Patient ill    []  Conflicting appointment    [] No transportation      [x] Conflict with work    [] No reason given    [] Weather related    [] COVID-19    [] Other:      Comments:        [x] Next appointment was confirmed    Electronically signed by: Danitza Robison PT

## 2025-01-31 ENCOUNTER — HOSPITAL ENCOUNTER (OUTPATIENT)
Dept: PHYSICAL THERAPY | Age: 40
Setting detail: THERAPIES SERIES
Discharge: HOME | End: 2025-01-31
Payer: COMMERCIAL

## 2025-01-31 PROCEDURE — 97110 THERAPEUTIC EXERCISES: CPT

## 2025-01-31 NOTE — FLOWSHEET NOTE
[facility]   Outpatient Rehabilitation & Therapy  [address] [facility] [address]  P: [phone]   F: [phone]    Physical Therapy Daily Treatment Note    Date:  2025  Patient Name:  Wilfredo Ortiz    :  1985  MRN: [id number]  Physician: Ana David APRN - CNP                            Insurance: --[place]  , 2-3 x week for 4-6 weeks. 12 visits, valid 1/10 -    Medical Diagnosis: S90.121A (ICD-10-CM) - Contusion of right lesser toe(s) without damage to nail, initial encounter          Rehab Codes: M25.6 , M62.81 , M79.674 , M79.67   Onset Date: 2024 DOI               Next 's appt: TBD  Visit# / total visits: 3/12  Cancels/No Shows: 0/0    Subjective:  Patient reports that toes felt \"pretty good\" last night at work after a 17 hour shift. Reported that toe is more swollen arriving this afternoon.   Pain:  [] Yes  [x] No Location: Right 4-5th digits/toes   Pain Rating: (0-10 scale) 0/10  Pain altered Tx:  [x] No  [] Yes  Action:  Comments:    Objective:  Modalities:   Precautions:  Exercises:  INTERVENTIONS  Reps/ Time Weight/ Level Completed  Today Comments    PROM Right 4-5 digits  4'    x  pain with flex and ext empty end feel   MODALITIES                                    MANUAL                                    EXERCISES            Kneeling great Toe extension stretch 20\" x3  x    Slant board stretch 3x30\"  x    Towel Scrunches 20 x 3\"   x     Great Toe Extension 10 x   x     Lesser Toe Extension 10 x   x     marble pick ups  1x 14 marbles  x  over and back   SL heel raises  10x 2  x    MOBO board 20x ea peg position  x 1/3, 2/4   Fitter board F/B, Lateral 20x ea dir  x    SLS on foam 20\" x3  x    4-way ankle  10x ea Red  HEP                                                       Specific Instructions for next treatment: review HEP, PROM (self or therapist), marble pick ups, CKC foot / ankle strengthening, kneeling toe extension stretch     Assessment: [x] Progressing

## 2025-02-03 ENCOUNTER — APPOINTMENT (OUTPATIENT)
Dept: PHYSICAL THERAPY | Age: 40
End: 2025-02-03
Payer: COMMERCIAL

## 2025-02-05 ENCOUNTER — APPOINTMENT (OUTPATIENT)
Dept: PHYSICAL THERAPY | Age: 40
End: 2025-02-05
Payer: COMMERCIAL

## 2025-02-06 ENCOUNTER — HOSPITAL ENCOUNTER (OUTPATIENT)
Dept: PHYSICAL THERAPY | Age: 40
Setting detail: THERAPIES SERIES
Discharge: HOME | End: 2025-02-06
Payer: COMMERCIAL

## 2025-02-06 PROCEDURE — 97110 THERAPEUTIC EXERCISES: CPT

## 2025-02-06 PROCEDURE — 97112 NEUROMUSCULAR REEDUCATION: CPT

## 2025-02-06 NOTE — FLOWSHEET NOTE
[facility]   Outpatient Rehabilitation & Therapy  [address] [facility] [address]  P: [phone]   F: [phone]    Physical Therapy Daily Treatment Note    Date:  2025  Patient Name:  Wilfredo Ortiz    :  1985  MRN: [id number]  Physician: Ana David APRN - CNP                            Insurance: --[place]  , 2-3 x week for 4-6 weeks. 12 visits, valid 1/10 -    Medical Diagnosis: S90.121A (ICD-10-CM) - Contusion of right lesser toe(s) without damage to nail, initial encounter          Rehab Codes: M25.6 , M62.81 , M79.674 , M79.67   Onset Date: 2024 DOI               Next 's appt: TBD  Visit# / total visits:   Cancels/No Shows: 1/0    Subjective:  Patient reports still having increased pain in tip of R 5th toe and plantar surface of toe at MP joint.  Pt reports feels like a \"callus\" developing under toe but denies any changes in skin integrity.      Pain:  [] Yes  [x] No Location: Right 4-5th digits/toes   Pain Rating: (0-10 scale) 0/10  Pain altered Tx:  [x] No  [] Yes  Action:  Comments:    Objective:  Modalities:   Precautions:  Exercises:  INTERVENTIONS  Reps/ Time Weight/ Level Completed  Today Comments    PROM Right 4-5 digits  4'    x  pain with flex and ext empty end feel   MODALITIES                                    MANUAL                                    EXERCISES            Kneeling great Toe extension stretch 20\" x3  x    Slant board stretch 3x30\"  x    Towel Scrunches 20 x 3\"   x     Great Toe Extension 15 x   x     Lesser Toe Extension 15 x   x     marble pick ups  1x 14 marbles  x  over and back   SL heel raises  10x 2  x    MOBO board 20x ea peg position  x 1/3, 2/4 Shoe off   Fitter board F/B, Lateral 20x ea dir  x    SLS on foam 20\" x3  x Shoe off   4-way ankle  10x ea Red  HEP   Lunges F 10x  x                                                 Specific Instructions for next treatment: review HEP, PROM (self or therapist), marble pick ups, [facility] foot

## 2025-02-07 ENCOUNTER — HOSPITAL ENCOUNTER (OUTPATIENT)
Dept: PHYSICAL THERAPY | Age: 40
Setting detail: THERAPIES SERIES
Discharge: HOME | End: 2025-02-07
Payer: COMMERCIAL

## 2025-02-07 PROCEDURE — 97110 THERAPEUTIC EXERCISES: CPT

## 2025-02-07 PROCEDURE — 97112 NEUROMUSCULAR REEDUCATION: CPT

## 2025-02-07 NOTE — FLOWSHEET NOTE
[facility]   Outpatient Rehabilitation & Therapy  [address] [facility] [address]  P: [phone]   F: [phone]    Physical Therapy Daily Treatment Note    Date:  2025  Patient Name:  Wilfredo Ortiz    :  1985  MRN: [id number]  Physician: Ana David APRN - CNP                            Insurance: --[place]  , 2-3 x week for 4-6 weeks. 12 visits, valid 1/10 -    Medical Diagnosis: S90.121A (ICD-10-CM) - Contusion of right lesser toe(s) without damage to nail, initial encounter          Rehab Codes: M25.6 , M62.81 , M79.674 , M79.67   Onset Date: 2024 DOI               Next 's appt: TBD  Visit# / total visits:   Cancels/No Shows: 1/0    Subjective:  Patient comes in reporting a little more tenderness but reported that he did a lot more with his shoes not on during last therapy session.    Pain:  [] Yes  [x] No Location: Right 4-5th digits/toes   Pain Rating: (0-10 scale) 4/10  Pain altered Tx:  [x] No  [] Yes  Action:  Comments:    Objective:  Modalities:   Precautions:  Exercises:  INTERVENTIONS  Reps/ Time Weight/ Level Completed  Today Comments    PROM Right 4-5 digits  4'      pain with flex and ext empty end feel   MODALITIES                                    MANUAL                                    EXERCISES            Kneeling great Toe extension stretch 20\" x3      Towel Scrunches 20 x 3\"        Great Toe Extension 15 x   x     Lesser Toe Extension 15 x   x     marble pick ups  1x 14 marbles  x  over and back   4-way ankle 10x eac Red   HEP                 Standing:       SL heel raises  10x 2  x    Heel Raises with Toe Elevation 10 x  x Added 2/7   MOBO board 20x ea peg position  x 1/3, 2/4 Shoe off   Fitter board F/B, Lateral 20x ea dir  x    SLS on foam 20\" x3  x Shoe off   Lunges F RIO 10x  x Progressed to RIO 2/7   Slant Board Stretch 3x30\"                                            Specific Instructions for next treatment: review HEP, PROM (self or

## 2025-02-10 ENCOUNTER — APPOINTMENT (OUTPATIENT)
Dept: PHYSICAL THERAPY | Age: 40
End: 2025-02-10
Payer: COMMERCIAL

## 2025-02-12 ENCOUNTER — APPOINTMENT (OUTPATIENT)
Dept: PHYSICAL THERAPY | Age: 40
End: 2025-02-12
Payer: COMMERCIAL

## 2025-02-13 ENCOUNTER — HOSPITAL ENCOUNTER (OUTPATIENT)
Dept: PHYSICAL THERAPY | Age: 40
Setting detail: THERAPIES SERIES
Discharge: HOME | End: 2025-02-13
Payer: COMMERCIAL

## 2025-02-13 PROCEDURE — 97110 THERAPEUTIC EXERCISES: CPT

## 2025-02-14 ENCOUNTER — HOSPITAL ENCOUNTER (OUTPATIENT)
Dept: PHYSICAL THERAPY | Age: 40
Setting detail: THERAPIES SERIES
Discharge: HOME | End: 2025-02-14
Payer: COMMERCIAL

## 2025-02-14 PROCEDURE — 97110 THERAPEUTIC EXERCISES: CPT

## 2025-02-14 NOTE — PROGRESS NOTES
[facility]   Outpatient Rehabilitation & Therapy  [address]  P: [phone]   F: [phone]    Physical Therapy Daily Treatment Note / Progress Note    Date:  2025  Patient Name:  Wilfredo Ortiz    :  1985  MRN: [id number]  Physician: Ana Dvaid APRN - CNP                            Insurance: --[place]  , 2-3 x week for 4-6 weeks. 12 visits, valid 1/10 -    Medical Diagnosis: S90.121A (ICD-10-CM) - Contusion of right lesser toe(s) without damage to nail, initial encounter          Rehab Codes: M25.6 , M62.81 , M79.674 , M79.67   Onset Date: 2024 DOI               Next 's appt: TBD  Visit# / total visits:   Cancels/No Shows: 1/0    Subjective:   Pt comes in reporting that his pain is not too bad coming in. Reports that he feels like he can walk on his foot now more so than when he first started physical therapy. Feels like the pain has been decreasing, just the tip of his toe where he still gets the sharp pain. Still noticing difficulty with toe scrunch with some pain when he does succeed with it. Certain movements overall seems to irritate it. Pain at worst over the past few weeks has been 4-5/10 overall.     Pain:  [x] Yes  [] No Location: Right 4-5th digits/toes   Pain Rating: (0-10 scale) 4-5/10 at rest  Pain altered Tx:  [x] No  [] Yes  Action:  Comments:    Objective:     Range of Motion  Left  Range of Motion  Right   Proximal Phalange ABD 30 AROM 30 AROM  pain   Proximal Phalange Flexion  60 AROM 15 AROM  pain   Able to complete 25 reps of heel raises with noting mild to moderate pain throughout, increase toe extension noted throughout as well at the RLE    Modalities:   Precautions:  Exercises:  INTERVENTIONS  Reps/ Time Weight/ Level Completed  Today Comments    PROM Right 4-5 digits  4'        MODALITIES                                    MANUAL                                    EXERCISES            Kneeling great Toe extension stretch

## 2025-02-17 ENCOUNTER — APPOINTMENT (OUTPATIENT)
Dept: PHYSICAL THERAPY | Age: 40
End: 2025-02-17
Payer: COMMERCIAL

## 2025-02-19 ENCOUNTER — APPOINTMENT (OUTPATIENT)
Dept: PHYSICAL THERAPY | Age: 40
End: 2025-02-19
Payer: COMMERCIAL

## 2025-02-21 ENCOUNTER — HOSPITAL ENCOUNTER (OUTPATIENT)
Dept: PHYSICAL THERAPY | Age: 40
Setting detail: THERAPIES SERIES
Discharge: HOME | End: 2025-02-21
Payer: COMMERCIAL

## 2025-02-21 PROCEDURE — 97110 THERAPEUTIC EXERCISES: CPT

## 2025-02-21 NOTE — FLOWSHEET NOTE
[facility]   Outpatient Rehabilitation & Therapy  [address] [facility]  P: [phone]   F: [phone]    Physical Therapy Daily Treatment Note     Date:  2025  Patient Name:  Wilfredo Ortiz    :  1985  MRN: [id number]  Physician: Ana David APRN - CNP                            Insurance: --[place]  , 2-3 x week for 4-6 weeks. 12 visits, valid 1/10 -    Medical Diagnosis: S90.121A (ICD-10-CM) - Contusion of right lesser toe(s) without damage to nail, initial encounter          Rehab Codes: M25.6 , M62.81 , M79.674 , M79.67   Onset Date: 2024 DOI               Next 's appt: TBD  Visit# / total visits:   Cancels/No Shows: 1/0    Subjective:   Pt reports 3/10 pain at beginning of session. PT reports being compliant with HEP. Feels like the pain has been decreasing, just the tip of his toe where he still gets the sharp pain.     Pain:  [x] Yes  [] No Location: Right 4-5th digits/toes   Pain Rating: (0-10 scale) 3/10 at rest  Pain altered Tx:  [x] No  [] Yes  Action:  Comments:        Objective:  Taken at PN    Range of Motion  Left  Range of Motion  Right   Proximal Phalange ABD 30 AROM 30 AROM  pain   Proximal Phalange Flexion  60 AROM 15 AROM  pain   Able to complete 25 reps of heel raises with noting mild to moderate pain throughout, increase toe extension noted throughout as well at the RLE    Modalities:   Precautions:  Exercises:  INTERVENTIONS  Reps/ Time Weight/ Level Completed  Today Comments    PROM Right 4-5 digits  4'        MODALITIES                                    MANUAL                                    EXERCISES            Kneeling great Toe extension stretch 20\" x3      Towel Scrunches 20 x 3\"   x     Great Toe Extension 15 x   x     Lesser Toe Extension 15 x   x     marble pick ups  1x 14 marbles    over and back   4-way ankle 15x eac Red   HEP                 Standing:       SL heel raises  10x 2 7.5# KB x Added weight 2/15   Heel

## 2025-02-24 ENCOUNTER — APPOINTMENT (OUTPATIENT)
Dept: PHYSICAL THERAPY | Age: 40
End: 2025-02-24
Payer: COMMERCIAL

## 2025-02-26 ENCOUNTER — APPOINTMENT (OUTPATIENT)
Dept: PHYSICAL THERAPY | Age: 40
End: 2025-02-26
Payer: COMMERCIAL

## 2025-02-28 ENCOUNTER — APPOINTMENT (OUTPATIENT)
Dept: PHYSICAL THERAPY | Age: 40
End: 2025-02-28
Payer: COMMERCIAL

## 2025-04-15 ENCOUNTER — HOSPITAL ENCOUNTER (EMERGENCY)
Age: 40
Discharge: HOME | End: 2025-04-15
Attending: EMERGENCY MEDICINE
Payer: COMMERCIAL

## 2025-04-15 VITALS
RESPIRATION RATE: 16 BRPM | HEART RATE: 82 BPM | HEIGHT: 72 IN | TEMPERATURE: 97.9 F | OXYGEN SATURATION: 99 % | DIASTOLIC BLOOD PRESSURE: 86 MMHG | BODY MASS INDEX: 21.67 KG/M2 | SYSTOLIC BLOOD PRESSURE: 123 MMHG | WEIGHT: 160 LBS

## 2025-04-15 DIAGNOSIS — J02.0 STREPTOCOCCAL SORE THROAT: ICD-10-CM

## 2025-04-15 DIAGNOSIS — J02.9 SORE THROAT: ICD-10-CM

## 2025-04-15 DIAGNOSIS — S70.12XA CONTUSION OF LEFT THIGH, INITIAL ENCOUNTER: ICD-10-CM

## 2025-04-15 DIAGNOSIS — S70.11XA CONTUSION OF RIGHT THIGH, INITIAL ENCOUNTER: Primary | ICD-10-CM

## 2025-04-15 LAB
SPECIMEN SOURCE: ABNORMAL
STREP A, MOLECULAR: POSITIVE

## 2025-04-15 PROCEDURE — 6370000000 HC RX 637 (ALT 250 FOR IP): Performed by: PHYSICIAN ASSISTANT

## 2025-04-15 PROCEDURE — 87651 STREP A DNA AMP PROBE: CPT

## 2025-04-15 PROCEDURE — 99283 EMERGENCY DEPT VISIT LOW MDM: CPT

## 2025-04-15 RX ORDER — AMOXICILLIN 250 MG/1
500 CAPSULE ORAL ONCE
Status: COMPLETED | OUTPATIENT
Start: 2025-04-15 | End: 2025-04-15

## 2025-04-15 RX ORDER — AMOXICILLIN 500 MG/1
500 CAPSULE ORAL 2 TIMES DAILY
Qty: 20 CAPSULE | Refills: 0 | Status: SHIPPED | OUTPATIENT
Start: 2025-04-15 | End: 2025-04-25

## 2025-04-15 RX ADMIN — AMOXICILLIN 500 MG: 250 CAPSULE ORAL at 19:07

## 2025-04-15 ASSESSMENT — PAIN - FUNCTIONAL ASSESSMENT: PAIN_FUNCTIONAL_ASSESSMENT: 0-10

## 2025-04-15 ASSESSMENT — PAIN SCALES - GENERAL: PAINLEVEL_OUTOF10: 8

## 2025-04-15 ASSESSMENT — PAIN DESCRIPTION - LOCATION: LOCATION: LEG;THROAT

## 2025-04-15 ASSESSMENT — PAIN DESCRIPTION - ORIENTATION: ORIENTATION: RIGHT

## 2025-04-15 NOTE — DISCHARGE INSTRUCTIONS
Ice   Tylenol or motrin for pain  Try to see if there is another position that doesn't involve pressing thigh region into hard fixed object repeatedly.

## 2025-04-15 NOTE — ED TRIAGE NOTES
Mode of arrival (squad #, walk in, police, etc) : CAR        Chief complaint(s): LEFT LEG INJURY AND SORE THROAT        Arrival Note (brief scenario, treatment PTA, etc).: states he has large bruises to left leg, states injured at job,noticed 3 days ago. sore throat x 3 days.         C= \"Have you ever felt that you should Cut down on your drinking?\"  No  A= \"Have people Annoyed you by criticizing your drinking?\"  No  G= \"Have you ever felt bad or Guilty about your drinking?\"  No  E= \"Have you ever had a drink as an Eye-opener first thing in the morning to steady your nerves or to help a hangover?\"  No      Deferred []      Reason for deferring: N/A    *If yes to two or more: probable alcohol abuse.*

## 2025-04-15 NOTE — ED PROVIDER NOTES
[facility] EMERGENCY DEPARTMENT  eMERGENCY dEPARTMENT eNCOUnter      Pt Name: Wilfredo Ortiz  MRN: [id number]  Birthdate 1985  Date of evaluation: 4/15/25      CHIEF COMPLAINT       Chief Complaint   Patient presents with    Leg Injury    Pharyngitis         HISTORY OF PRESENT ILLNESS    Wilfredo Ortiz is a 40 y.o. male who presents complaining of leg pain.   The history is provided by the patient.   Leg Injury  Location:  Leg  Time since incident:  2 days  Lower extremity injury: bilateral leg pain, encinas at work bumping into his legs.    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        Bruises to bilateral legs   All other systems reviewed and are negative.      PAST MEDICAL HISTORY     Past Medical History:   Diagnosis Date    Mitral valve prolapse        SURGICAL HISTORY       Past Surgical History:   Procedure Laterality Date    CARDIAC SURGERY  infant       CURRENT MEDICATIONS       Previous Medications    ACETAMINOPHEN (TYLENOL) 325 MG TABLET    Take 1 tablet by mouth every 6 hours as needed for Pain    ALBUTEROL SULFATE HFA (PROAIR HFA) 108 (90 BASE) MCG/ACT INHALER    Inhale 2 puffs into the lungs every 6 hours as needed for Wheezing    BENZONATATE (TESSALON PERLES) 100 MG CAPSULE    Take 1 capsule by mouth 3 times daily as needed for

## 2025-04-16 NOTE — ED PROVIDER NOTES
[facility] EMERGENCY DEPARTMENT  eMERGENCY dEPARTMENT eNCOUnter   Independent Attestation     Pt Name: Wilfredo Ortiz  MRN: [id number]  Birthdate 1985  Date of evaluation: 4/15/25   Wilfredo Ortiz is a 40 y.o. male who presents with Leg Injury and Pharyngitis    Vitals:   Vitals:    04/15/25 1716   BP: 123/86   Pulse: 82   Resp: 16   Temp: 97.9 °F (36.6 °C)   TempSrc: Oral   SpO2: 99%   Weight: 72.6 kg (160 lb)   Height: 1.829 m (6')     Impression:   1. Contusion of right thigh, initial encounter    2. Contusion of left thigh, initial encounter    3. Sore throat    4. Streptococcal sore throat      I was personally available for consultation in the Emergency Department. I have reviewed the chart and agree with the documentation as recorded by the MLP, including the assessment, treatment plan and disposition.  Justino Montero MD  Attending Emergency  Physician                  Justino Montero MD  04/15/25 [id number]

## 2025-04-25 ENCOUNTER — OFFICE VISIT (OUTPATIENT)
Dept: PODIATRY | Age: 40
End: 2025-04-25
Payer: COMMERCIAL

## 2025-04-25 VITALS — HEIGHT: 72 IN | WEIGHT: 160 LBS | BODY MASS INDEX: 21.67 KG/M2

## 2025-04-25 DIAGNOSIS — S97.101A CRUSH INJURY, TOE, RIGHT, INITIAL ENCOUNTER: Primary | ICD-10-CM

## 2025-04-25 DIAGNOSIS — R60.0 EDEMA, LOWER EXTREMITY: ICD-10-CM

## 2025-04-25 DIAGNOSIS — G89.29 CHRONIC PAIN OF TOE OF RIGHT FOOT: ICD-10-CM

## 2025-04-25 DIAGNOSIS — M79.674 CHRONIC PAIN OF TOE OF RIGHT FOOT: ICD-10-CM

## 2025-04-25 DIAGNOSIS — G57.91 NEURITIS OF FOOT, RIGHT: ICD-10-CM

## 2025-04-25 PROCEDURE — 99203 OFFICE O/P NEW LOW 30 MIN: CPT | Performed by: PODIATRIST

## 2025-04-25 NOTE — PROGRESS NOTES
Wilfredo Ortiz is a 40 y.o. male who presents to the office today with chief complaint of pain to the right fifth toe.  Chief Complaint   Patient presents with    Toe Pain     Right 5th toe crushed by roller at work   Went to pt  Still gets pain on tip of toe that radiates up leg    Symptoms began just over 4 month(s) ago on December 06, 2024. Patient complains of injury to the right fifth toe. Patient states that he someone accidentally rolled a piece of equipment over his foot at work. Patient states that his right fifth toe was crushed. Patient went to the ED the same day and had xrays taken. Patient was told that there was no break. Patient was off for a few days and then placed on limited activity at work. Patient states that he was referred to physical therapy from occupational health and attended this for about 8 or 9 visits. Patient states that this may have helped a little, but not much. Patient has been given a different position at work due to his condition and he is still at the current position. Patient comes in today as he is still experiencing pain to the tip and the pad of the right fifth toe, especially with walking on stairs and when squating. Patient is concerned as his toe is not moving as the other toes are. Pain is rated 4 out of 10 at it's worst and is described as intermittent.      No Known Allergies    Past Medical History:   Diagnosis Date    Mitral valve prolapse        Prior to Admission medications    Medication Sig Start Date End Date Taking? Authorizing Provider   ibuprofen (ADVIL;MOTRIN) 600 MG tablet Take 1 tablet by mouth every 8 hours as needed for Pain 9/1/22  Yes Justino Montero MD   acetaminophen (TYLENOL) 325 MG tablet Take 1 tablet by mouth every 6 hours as needed for Pain 2/8/18  Yes Ian Appiah DO   cyclobenzaprine (FLEXERIL) 10 MG tablet Take 1 tablet by mouth every 8 hours as needed for Muscle spasms  Patient not taking: Reported on 4/25/2025 7/25/19   Mindy

## 2025-04-28 ASSESSMENT — ENCOUNTER SYMPTOMS
COLOR CHANGE: 0
DIARRHEA: 0
SHORTNESS OF BREATH: 0
NAUSEA: 0
BACK PAIN: 0